# Patient Record
Sex: FEMALE | RURAL
[De-identification: names, ages, dates, MRNs, and addresses within clinical notes are randomized per-mention and may not be internally consistent; named-entity substitution may affect disease eponyms.]

---
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
## 2023-12-01 ENCOUNTER — APPOINTMENT (OUTPATIENT)
Dept: CT IMAGING | Facility: HOSPITAL | Age: 18
End: 2023-12-01
Payer: MEDICAID

## 2023-12-01 ENCOUNTER — HOSPITAL ENCOUNTER (EMERGENCY)
Facility: HOSPITAL | Age: 18
Discharge: HOME | End: 2023-12-01
Attending: EMERGENCY MEDICINE
Payer: MEDICAID

## 2023-12-01 VITALS
WEIGHT: 113.32 LBS | HEIGHT: 66 IN | TEMPERATURE: 98.2 F | SYSTOLIC BLOOD PRESSURE: 116 MMHG | DIASTOLIC BLOOD PRESSURE: 74 MMHG | RESPIRATION RATE: 16 BRPM | BODY MASS INDEX: 18.21 KG/M2 | OXYGEN SATURATION: 100 % | HEART RATE: 70 BPM

## 2023-12-01 DIAGNOSIS — R09.A2 FOREIGN BODY SENSATION IN THROAT: Primary | ICD-10-CM

## 2023-12-01 PROCEDURE — 70490 CT SOFT TISSUE NECK W/O DYE: CPT

## 2023-12-01 PROCEDURE — 99284 EMERGENCY DEPT VISIT MOD MDM: CPT

## 2023-12-02 NOTE — ED NOTES
Pt A&Ox 4 with lungs CTA bases. Pt ambulatory at this time.   Discharge instruction given to pt with verbal understanding received. Pt discharged with education and personal belongings.

## 2023-12-02 NOTE — DISCHARGE INSTRUCTIONS
Warm salt water gargles this weekend.  May use Chloraseptic for throat.  Return new or worsening symptoms or if would develop increased trouble swallowing

## 2023-12-02 NOTE — ED PROVIDER NOTES
"Subjective   History of Present Illness  18-year-old female presents after was eating sweet potato fries last night and felt a sharp burning pain left side of her throat.  She has had some discomfort since then.  She has been able to swallow liquids without difficulty.  She has had no breathing trouble.  Review of Systems    No past medical history on file.  Reflux, psoriasis  No Known Allergies    No past surgical history on file.    No family history on file.    Social History     Socioeconomic History    Marital status: Single     Stelara injections 4 times a year      Objective   Physical Exam  18-year-old female awake alert.  Oropharynx clear airway patent neck is supple without adenopathy.  She is having no shortness of breath.  She is able to swallow liquids without difficulty.  Procedures           ED Course      No results found for this or any previous visit.  CT Soft Tissue Neck Without Contrast    Result Date: 12/1/2023  Impression: 1. No radiopaque foreign body within the soft tissues of the neck. 2. No evidence of pharyngeal edema or abscess. Electronically Signed: Catalino Serna  12/1/2023 10:47 PM EST  Workstation ID: [id number]   Medications - No data to display  /76   Pulse 82   Temp 97.8 °F (36.6 °C)   Resp 18   Ht 167.6 cm (66\")   Wt 51.4 kg (113 lb 5.1 oz)   LMP 11/29/2023   SpO2 99%   BMI 18.29 kg/m²                                          Medical Decision Making    Treatment options were discussed.  After discussion it was elected to treat symptomatically.  Advised would anticipate this will resolve over the weekend that she likely has an area of irritation.  It is unlikely that there is a foreign body present.  If she is still having symptoms Monday she likely would require endoscopy.  They were agreeable with plan.  CT scan was discussed but after discussion it was not felt that the radiation was worth the benefit.  She was discharged advised warm salt water gargles " Chloraseptic for throat to follow-up with ENT if symptoms persist return new or worsening symptoms.  As patient was going to be discharged today started to say they were certain that there was a sweet potato fries stuck in her throat.  This is all been discussed previously.  Will obtain CT scan to evaluate for foreign body.  CT scan of neck reveals no evidence of pharyngeal edema abscess foreign material airway abnormality.  Findings were again discussed with patient and mother.  She was discharged.  She was more reassured.  Advised if still having symptoms Monday to follow-up with ENT for further evaluation.  Final diagnoses:   Foreign body sensation in throat       ED Disposition  ED Disposition       ED Disposition   Discharge    Condition   Stable    Comment   --               [facility]  [address]  [facility] [address]  [phone]             Medication List      No changes were made to your prescriptions during this visit.            Karthik Thomas MD  12/01/23 2036       Karthik Thomas MD  12/01/23 2047       Karthik Thomas MD  12/01/23 2307

## 2024-01-09 ENCOUNTER — HOSPITAL ENCOUNTER (EMERGENCY)
Facility: HOSPITAL | Age: 19
Discharge: HOME | End: 2024-01-09
Attending: EMERGENCY MEDICINE | Admitting: EMERGENCY MEDICINE
Payer: MEDICAID

## 2024-01-09 ENCOUNTER — APPOINTMENT (OUTPATIENT)
Dept: CT IMAGING | Facility: HOSPITAL | Age: 19
End: 2024-01-09
Payer: MEDICAID

## 2024-01-09 ENCOUNTER — APPOINTMENT (OUTPATIENT)
Dept: GENERAL RADIOLOGY | Facility: HOSPITAL | Age: 19
End: 2024-01-09
Payer: MEDICAID

## 2024-01-09 VITALS
WEIGHT: 113.32 LBS | DIASTOLIC BLOOD PRESSURE: 77 MMHG | OXYGEN SATURATION: 98 % | RESPIRATION RATE: 18 BRPM | TEMPERATURE: 98 F | HEIGHT: 66 IN | HEART RATE: 77 BPM | SYSTOLIC BLOOD PRESSURE: 125 MMHG | BODY MASS INDEX: 18.21 KG/M2

## 2024-01-09 DIAGNOSIS — S20.211A CONTUSION OF RIB ON RIGHT SIDE, INITIAL ENCOUNTER: ICD-10-CM

## 2024-01-09 DIAGNOSIS — S40.011A CONTUSION OF RIGHT SHOULDER, INITIAL ENCOUNTER: ICD-10-CM
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
## 2024-01-09 DIAGNOSIS — S09.90XA MINOR CLOSED HEAD INJURY: ICD-10-CM

## 2024-01-09 PROCEDURE — 71101 X-RAY EXAM UNILAT RIBS/CHEST: CPT

## 2024-01-09 PROCEDURE — 99284 EMERGENCY DEPT VISIT MOD MDM: CPT

## 2024-01-09 PROCEDURE — 70450 CT HEAD/BRAIN W/O DYE: CPT

## 2024-01-09 PROCEDURE — 73502 X-RAY EXAM HIP UNI 2-3 VIEWS: CPT

## 2024-01-09 PROCEDURE — 73030 X-RAY EXAM OF SHOULDER: CPT

## 2024-01-09 NOTE — Clinical Note
[facility] EMERGENCY DEPARTMENT  [address] [place] [address]  Phone: [phone]    Justine Amaya was seen and treated in our emergency department on 1/9/2024.  She may return to work on 01/11/2024.         Thank you for choosing [facility].    Nga Marinelli RN

## 2024-01-10 NOTE — ED PROVIDER NOTES
Subjective     Provider in Triage Note  Due to significant overcrowding in the emergency department patient was initially seen and evaluated in triage.  Provider in triage recommended patient placement in the treatment area to initiate therapy and movement to an ER bed as soon as possible.    Patient is a 18-year-old female with no significant past medical history presents to the ER with complaints of fall today.  Patient states that she slipped and fell in the rain.  She reports landing on her left side.  She is unsure if she hit her head.  She is complaining of right shoulder pain, right hip pain and headache.  She rates her pain as mild intensity.  No lightheadedness dizziness or blurry vision.  Patient ambulated into the ER independently.  She does have some right rib pain but no shortness of breath.  No vomiting.  No hemoptysis.      History of Present Illness  I reviewed the provider in triage note and attest this is the HPI        Review of Systems   Constitutional:  Negative for chills, fatigue and fever.   HENT:  Negative for congestion, tinnitus and trouble swallowing.    Eyes:  Negative for photophobia, discharge and redness.   Respiratory:  Negative for cough and shortness of breath.    Cardiovascular:  Negative for chest pain and palpitations.   Gastrointestinal:  Negative for abdominal pain, diarrhea, nausea and vomiting.   Genitourinary:  Negative for dysuria, frequency and urgency.   Musculoskeletal:  Positive for back pain. Negative for joint swelling and myalgias.        Right shoulder right ribs right hip pain low back pain   Skin:  Negative for rash.   Neurological:  Negative for dizziness and headaches.   Psychiatric/Behavioral:  Negative for confusion.    All other systems reviewed and are negative.      History reviewed. No pertinent past medical history.    No Known Allergies    History reviewed. No pertinent surgical history.    History reviewed. No pertinent family history.    Social  History     Socioeconomic History    Marital status: Single           Objective   Physical Exam  Vitals reviewed.   Constitutional:       Appearance: Normal appearance. She is well-developed.   HENT:      Head: Normocephalic and atraumatic.      Comments: Pain on axial load     Right Ear: External ear normal.      Left Ear: External ear normal.      Nose: Nose normal.      Mouth/Throat:      Mouth: Mucous membranes are moist.      Pharynx: Oropharynx is clear.   Eyes:      Conjunctiva/sclera: Conjunctivae normal.      Pupils: Pupils are equal, round, and reactive to light.   Cardiovascular:      Rate and Rhythm: Normal rate and regular rhythm.      Heart sounds: Normal heart sounds.   Pulmonary:      Effort: Pulmonary effort is normal. No respiratory distress.      Breath sounds: Normal breath sounds. No wheezing.   Abdominal:      General: Bowel sounds are normal. There is no distension.      Palpations: Abdomen is soft. There is no mass.      Tenderness: There is no abdominal tenderness. There is no guarding or rebound.   Musculoskeletal:         General: No deformity. Normal range of motion.      Right shoulder: Tenderness present. Decreased range of motion.      Left shoulder: Normal.      Cervical back: Normal, normal range of motion and neck supple.      Lumbar back: Tenderness present. Normal range of motion.        Back:       Comments: No overlying erythema or induration no step-off no signs of trauma   Skin:     General: Skin is warm and dry.      Capillary Refill: Capillary refill takes less than 2 seconds.   Neurological:      General: No focal deficit present.      Mental Status: She is alert and oriented to person, place, and time.      GCS: GCS eye subscore is 4. GCS verbal subscore is 5. GCS motor subscore is 6.      Cranial Nerves: No cranial nerve deficit.      Sensory: No sensory deficit.      Deep Tendon Reflexes: Reflexes normal.   Psychiatric:         Mood and Affect: Mood normal.          "Behavior: Behavior normal.         Procedures       Due to significant overcrowding in the emergency department patient was evaluated by myself in a hallway bed. This exam may be limited by privacy, noise level and the patient not wearing a hospital gown.  Explained to the patient our limitations and our overcrowding.  They were in agreement to continue the exam and treatment at this time.       ED Course  ED Course as of 01/09/24 2223   Tue Jan 09, 2024 2147 Patient is waiting to go to CAT scan [KW]      ED Course User Index  [KW] Sonya Andrew, APRN      /84 (BP Location: Left arm, Patient Position: Sitting)   Pulse 97   Temp 97.7 °F (36.5 °C)   Resp 18   Ht 167.6 cm (66\")   Wt 51.4 kg (113 lb 5.1 oz)   SpO2 99%   BMI 18.29 kg/m²   Labs Reviewed - No data to display  Medications - No data to display  CT Head Without Contrast    Result Date: 1/9/2024  Impression: No acute intracranial process or calvarial fracture identified. Electronically Signed: Dhruv Barry MD  1/9/2024 10:13 PM EST  Workstation ID: [id number]    XR Hip With or Without Pelvis 2 - 3 View Right    Result Date: 1/9/2024  Normal pelvis and right hip. Electronically Signed: Marcelo Ga MD  1/9/2024 7:59 PM EST  Workstation ID: [id number]    XR Ribs Right With PA Chest    Result Date: 1/9/2024  No rib fractures. Electronically Signed: Marcelo Ga MD  1/9/2024 7:58 PM EST  Workstation ID: [id number]    XR Shoulder 2+ View Right    Result Date: 1/9/2024  Impression: Normal exam. Electronically Signed: Marcelo Ga MD  1/9/2024 7:55 PM EST  Workstation ID: [id number]                                          Medical Decision Making  Patient is a 18-year-old female who comes in after having a fall in the rain tonight complaining of right-sided discomfort worrisome for sprain strain contusion closed head injury fracture this is not an all-inclusive list.  Patient had above exam and x-rays were obtained and reviewed as well as a CT was " obtained and reviewed and read by the radiologist as well as myself as within normal limit patient was offered pain medication she states she does not wish any at this time she was advised use Tylenol Motrin at home for any further problems follow-up with primary care return if worse she verbalized understood discharge instructions as well as her mother but    Problems Addressed:  Contusion of rib on right side, initial encounter: acute illness or injury  Contusion of right shoulder, initial encounter: acute illness or injury  Fall, initial encounter: acute illness or injury  Minor closed head injury: acute illness or injury    Amount and/or Complexity of Data Reviewed  Independent Historian: parent     Details: Mother at bedside  Radiology: ordered and independent interpretation performed. Decision-making details documented in ED Course.    Risk  OTC drugs.        Final diagnoses:   Fall, initial encounter   Minor closed head injury   Contusion of rib on right side, initial encounter   Contusion of right shoulder, initial encounter       ED Disposition  ED Disposition       ED Disposition   Discharge    Condition   Stable    Comment   --               Luz Montano, APRN  [address] [place]  [address] [place] [address]  [phone]    In 5 days  If symptoms worsen, As needed         Medication List      No changes were made to your prescriptions during this visit.            Sonya Andrew, APRN  01/09/24 [id number]

## 2024-01-10 NOTE — DISCHARGE INSTRUCTIONS
Use Tylenol Motrin as needed at home for discomfort    Follow head injury precautions and return for any worsening symptoms.    Follow-up with primary care for any further problems return if worse